# Patient Record
Sex: MALE | Race: WHITE | NOT HISPANIC OR LATINO | Employment: FULL TIME | URBAN - METROPOLITAN AREA
[De-identification: names, ages, dates, MRNs, and addresses within clinical notes are randomized per-mention and may not be internally consistent; named-entity substitution may affect disease eponyms.]

---

## 2023-05-10 ENCOUNTER — OFFICE VISIT (OUTPATIENT)
Dept: PRIMARY CARE | Facility: CLINIC | Age: 48
End: 2023-05-10
Payer: COMMERCIAL

## 2023-05-10 VITALS
HEART RATE: 83 BPM | WEIGHT: 163 LBS | SYSTOLIC BLOOD PRESSURE: 130 MMHG | BODY MASS INDEX: 24.14 KG/M2 | DIASTOLIC BLOOD PRESSURE: 86 MMHG | OXYGEN SATURATION: 96 % | HEIGHT: 69 IN | RESPIRATION RATE: 16 BRPM | TEMPERATURE: 97.3 F

## 2023-05-10 DIAGNOSIS — Z72.0 TOBACCO ABUSE: ICD-10-CM

## 2023-05-10 DIAGNOSIS — F43.22 ANXIOUS MOOD AS ADJUSTMENT REACTION: ICD-10-CM

## 2023-05-10 DIAGNOSIS — M65.312 TRIGGER FINGER OF LEFT THUMB: ICD-10-CM

## 2023-05-10 DIAGNOSIS — Z00.00 HEALTHCARE MAINTENANCE: Primary | ICD-10-CM

## 2023-05-10 PROCEDURE — 99386 PREV VISIT NEW AGE 40-64: CPT | Performed by: FAMILY MEDICINE

## 2023-05-10 RX ORDER — BUPROPION HYDROCHLORIDE 150 MG/1
150 TABLET ORAL EVERY MORNING
Qty: 30 TABLET | Refills: 1 | Status: SHIPPED | OUTPATIENT
Start: 2023-05-10 | End: 2023-05-22 | Stop reason: DRUGHIGH

## 2023-05-10 RX ORDER — VARENICLINE TARTRATE 1 MG/1
1 TABLET, FILM COATED ORAL 2 TIMES DAILY
Qty: 60 TABLET | Refills: 2 | Status: SHIPPED | OUTPATIENT
Start: 2023-05-10 | End: 2023-05-22 | Stop reason: SINTOL

## 2023-05-10 NOTE — PROGRESS NOTES
"Subjective   Patient ID: Kamran Diaz is a 47 y.o. male who presents for Establish Care, right thumb, and right foot problem.  Annual exam and checkup  HPI annual exam and checkup  Patient presents today as a new patient  His last PCP was never had one  Last seen this PCP  Choose to come here because pt was referral here    Being seen today for pt want to stop smoking he as been smoking for 30 years  Pt also want better his health.  Pt is issues right thumb very stiff painful x few month   Pt  Rt very painful at night can't move it much.  Pt  states having issues his right foot at when gets up his   Foot want give out.  X 6 month.    No other concern    Review of systems  ; Patient seen today for exam denies any problems with headaches or vision, denies any shortness of breath chest pain nausea or vomiting, no black stool no blood in the stool no heartburn type symptoms denies any problems with constipation or diarrhea, and no dysuria-type symptoms    The patient's allergies medications were reviewed with them today    The patient's social family and surgical history or also reviewed here today, along with her past medical history.     Objective     Alert and active in  no acute distress  HEENT TMs clear oropharynx negative nares clear no drainage noted neck supple  With no adenopathy   Heart regular rate and rhythm without murmur and no carotid bruits  Lungs- clear to auscultation bilaterally, no wheeze or rhonchi noted  Thyroid -negative masses or nodularity  Abdomen- soft times four quadrants , bowel sounds positive no masses or organomegaly, negative tenderness guarding or rebound  Neurological exam unremarkable- DTRs in upper and lower extremities within normal limits.   skin -no lesions noted    Left trigger thumb noted      /86 (BP Location: Right arm, Patient Position: Sitting, BP Cuff Size: Adult)   Pulse 83   Temp 36.3 °C (97.3 °F) (Temporal)   Resp 16   Ht 1.753 m (5' 9\")   Wt 73.9 kg (163 lb)  "  SpO2 96%   BMI 24.07 kg/m²     No Known Allergies    Assessment/Plan   Problem List Items Addressed This Visit    None  Visit Diagnoses       Healthcare maintenance    -  Primary    Relevant Orders    Lipid Panel    CBC and Auto Differential    Comprehensive Metabolic Panel    Tobacco abuse        Relevant Medications    varenicline (Chantix) 1 mg tablet    buPROPion XL (Wellbutrin XL) 150 mg 24 hr tablet    Anxious mood as adjustment reaction        Relevant Medications    buPROPion XL (Wellbutrin XL) 150 mg 24 hr tablet    Trigger finger of left thumb            We encouraged him to continue to quit smoking he does not want does not want to hurt himself or others he will go ahead and start the Wellbutrin may help with his mood a little bit over the loss of his girlfriend and their break-up at this time and also try the Chantix one half tab twice a day at breakfast and dinner and then twice a day he will see doing next 4 weeks please follow up back in the office if things worsen or change, and go to the ER if you feel LIKE  your symptoms are life-threatening talk about some of the issues and get his blood work done      If anything worsens or changes please call us at once, follow up in the office as planned,

## 2023-05-15 ENCOUNTER — LAB (OUTPATIENT)
Dept: LAB | Facility: LAB | Age: 48
End: 2023-05-15
Payer: COMMERCIAL

## 2023-05-15 DIAGNOSIS — Z00.00 HEALTHCARE MAINTENANCE: ICD-10-CM

## 2023-05-15 LAB
ALANINE AMINOTRANSFERASE (SGPT) (U/L) IN SER/PLAS: 15 U/L (ref 10–52)
ALBUMIN (G/DL) IN SER/PLAS: 4.3 G/DL (ref 3.4–5)
ALKALINE PHOSPHATASE (U/L) IN SER/PLAS: 91 U/L (ref 33–120)
ANION GAP IN SER/PLAS: 12 MMOL/L (ref 10–20)
ASPARTATE AMINOTRANSFERASE (SGOT) (U/L) IN SER/PLAS: 13 U/L (ref 9–39)
BASOPHILS (10*3/UL) IN BLOOD BY AUTOMATED COUNT: 0.04 X10E9/L (ref 0–0.1)
BASOPHILS/100 LEUKOCYTES IN BLOOD BY AUTOMATED COUNT: 0.4 % (ref 0–2)
BILIRUBIN TOTAL (MG/DL) IN SER/PLAS: 0.7 MG/DL (ref 0–1.2)
CALCIUM (MG/DL) IN SER/PLAS: 9.5 MG/DL (ref 8.6–10.3)
CARBON DIOXIDE, TOTAL (MMOL/L) IN SER/PLAS: 26 MMOL/L (ref 21–32)
CHLORIDE (MMOL/L) IN SER/PLAS: 104 MMOL/L (ref 98–107)
CHOLESTEROL (MG/DL) IN SER/PLAS: 183 MG/DL (ref 0–199)
CHOLESTEROL IN HDL (MG/DL) IN SER/PLAS: 47.2 MG/DL
CHOLESTEROL/HDL RATIO: 3.9
CREATININE (MG/DL) IN SER/PLAS: 0.79 MG/DL (ref 0.5–1.3)
EOSINOPHILS (10*3/UL) IN BLOOD BY AUTOMATED COUNT: 0.12 X10E9/L (ref 0–0.7)
EOSINOPHILS/100 LEUKOCYTES IN BLOOD BY AUTOMATED COUNT: 1.2 % (ref 0–6)
ERYTHROCYTE DISTRIBUTION WIDTH (RATIO) BY AUTOMATED COUNT: 13.8 % (ref 11.5–14.5)
ERYTHROCYTE MEAN CORPUSCULAR HEMOGLOBIN CONCENTRATION (G/DL) BY AUTOMATED: 34.4 G/DL (ref 32–36)
ERYTHROCYTE MEAN CORPUSCULAR VOLUME (FL) BY AUTOMATED COUNT: 90 FL (ref 80–100)
ERYTHROCYTES (10*6/UL) IN BLOOD BY AUTOMATED COUNT: 4.31 X10E12/L (ref 4.5–5.9)
GFR MALE: >90 ML/MIN/1.73M2
GLUCOSE (MG/DL) IN SER/PLAS: 92 MG/DL (ref 74–99)
HEMATOCRIT (%) IN BLOOD BY AUTOMATED COUNT: 38.7 % (ref 41–52)
HEMOGLOBIN (G/DL) IN BLOOD: 13.3 G/DL (ref 13.5–17.5)
IMMATURE GRANULOCYTES/100 LEUKOCYTES IN BLOOD BY AUTOMATED COUNT: 0.3 % (ref 0–0.9)
LDL: 119 MG/DL (ref 0–99)
LEUKOCYTES (10*3/UL) IN BLOOD BY AUTOMATED COUNT: 9.6 X10E9/L (ref 4.4–11.3)
LYMPHOCYTES (10*3/UL) IN BLOOD BY AUTOMATED COUNT: 2.75 X10E9/L (ref 1.2–4.8)
LYMPHOCYTES/100 LEUKOCYTES IN BLOOD BY AUTOMATED COUNT: 28.5 % (ref 13–44)
MONOCYTES (10*3/UL) IN BLOOD BY AUTOMATED COUNT: 0.85 X10E9/L (ref 0.1–1)
MONOCYTES/100 LEUKOCYTES IN BLOOD BY AUTOMATED COUNT: 8.8 % (ref 2–10)
NEUTROPHILS (10*3/UL) IN BLOOD BY AUTOMATED COUNT: 5.85 X10E9/L (ref 1.2–7.7)
NEUTROPHILS/100 LEUKOCYTES IN BLOOD BY AUTOMATED COUNT: 60.8 % (ref 40–80)
PLATELETS (10*3/UL) IN BLOOD AUTOMATED COUNT: 233 X10E9/L (ref 150–450)
POTASSIUM (MMOL/L) IN SER/PLAS: 3.8 MMOL/L (ref 3.5–5.3)
PROTEIN TOTAL: 7.3 G/DL (ref 6.4–8.2)
SODIUM (MMOL/L) IN SER/PLAS: 138 MMOL/L (ref 136–145)
TRIGLYCERIDE (MG/DL) IN SER/PLAS: 86 MG/DL (ref 0–149)
UREA NITROGEN (MG/DL) IN SER/PLAS: 13 MG/DL (ref 6–23)
VLDL: 17 MG/DL (ref 0–40)

## 2023-05-15 PROCEDURE — 36415 COLL VENOUS BLD VENIPUNCTURE: CPT

## 2023-05-15 PROCEDURE — 85025 COMPLETE CBC W/AUTO DIFF WBC: CPT

## 2023-05-15 PROCEDURE — 80061 LIPID PANEL: CPT

## 2023-05-15 PROCEDURE — 80053 COMPREHEN METABOLIC PANEL: CPT

## 2023-05-22 ENCOUNTER — OFFICE VISIT (OUTPATIENT)
Dept: PRIMARY CARE | Facility: CLINIC | Age: 48
End: 2023-05-22
Payer: COMMERCIAL

## 2023-05-22 VITALS
HEIGHT: 69 IN | TEMPERATURE: 97.8 F | OXYGEN SATURATION: 97 % | HEART RATE: 81 BPM | DIASTOLIC BLOOD PRESSURE: 80 MMHG | WEIGHT: 166 LBS | BODY MASS INDEX: 24.59 KG/M2 | SYSTOLIC BLOOD PRESSURE: 134 MMHG | RESPIRATION RATE: 16 BRPM

## 2023-05-22 DIAGNOSIS — F32.9 REACTIVE DEPRESSION: ICD-10-CM

## 2023-05-22 DIAGNOSIS — Z72.0 TOBACCO ABUSE: ICD-10-CM

## 2023-05-22 DIAGNOSIS — F43.22 ANXIOUS MOOD AS ADJUSTMENT REACTION: Primary | ICD-10-CM

## 2023-05-22 DIAGNOSIS — G47.09 OTHER INSOMNIA: ICD-10-CM

## 2023-05-22 PROCEDURE — 99213 OFFICE O/P EST LOW 20 MIN: CPT | Performed by: FAMILY MEDICINE

## 2023-05-22 RX ORDER — BUPROPION HYDROCHLORIDE 300 MG/1
300 TABLET ORAL EVERY MORNING
Qty: 30 TABLET | Refills: 2 | Status: SHIPPED | OUTPATIENT
Start: 2023-05-22

## 2023-05-22 RX ORDER — HYDROXYZINE HYDROCHLORIDE 25 MG/1
TABLET, FILM COATED ORAL
Qty: 60 TABLET | Refills: 2 | Status: SHIPPED | OUTPATIENT
Start: 2023-05-22

## 2023-05-22 NOTE — PROGRESS NOTES
"Subjective   Patient ID: Kamran Diaz is a 47 y.o. male who presents for Stress and Nicotine Dependence.    HPI    Pt is here for 2 week follow for smoking and stress  Pt states that the side of effect from Chantix has been keep him up at night.  He states he has been getting a lot of dreams.  Pt when he takes his medication in the morning it makes him very jittery   Pt states that he is very stress out has to much on his plate.  Pt is still issues with depression  Pt want to discuss seeing a counselor.  Denies wanting to harm self or others.  Does not want to harm ex.  Admits not being able to sleep.  He states he is overly emotional.        Review of systems  ; Patient seen today for exam denies any problems with headaches or vision, denies any shortness of breath chest pain nausea or vomiting, no black stool no blood in the stool no heartburn type symptoms denies any problems with constipation or diarrhea, and no dysuria-type symptoms    The patient's allergies medications were reviewed with them today    The patient's social family and surgical history or also reviewed here today, along with her past medical history.     Objective     Alert and active in  no acute distress  HEENT TMs clear oropharynx negative nares clear no drainage noted neck supple  With no adenopathy   Heart regular rate and rhythm without murmur and no carotid bruits  Lungs- clear to auscultation bilaterally, no wheeze or rhonchi noted  Thyroid -negative masses or nodularity  Abdomen- soft times four quadrants , bowel sounds positive no masses or organomegaly, negative tenderness guarding or rebound  Neurological exam unremarkable- DTRs in upper and lower extremities within normal limits.   skin -no lesions noted      /80 (BP Location: Right arm, Patient Position: Sitting)   Pulse 81   Temp 36.6 °C (97.8 °F) (Temporal)   Resp 16   Ht 1.753 m (5' 9\")   Wt 75.3 kg (166 lb)   SpO2 97%   BMI 24.51 kg/m²     No Known " Allergies    Assessment/Plan   Problem List Items Addressed This Visit    None  Visit Diagnoses       Tobacco abuse        Anxious mood as adjustment reaction        Other insomnia              Stop Chantix.    Increase Wellbutrin to 300 mg.    Atarax 25mg 1-2 tablets at bedtime prescribed today.    Referral to psychiatry placed.    If anything worsens or changes please call us at once, follow up in the office as planned.Let us know by anything    Scribe Attestation  By signing my name below, I, Do Patel MA, Scribe   attest that this documentation has been prepared under the direction and in the presence of Kirt Duron DO.

## 2023-05-24 ENCOUNTER — APPOINTMENT (OUTPATIENT)
Dept: PRIMARY CARE | Facility: CLINIC | Age: 48
End: 2023-05-24
Payer: COMMERCIAL

## 2025-02-13 ENCOUNTER — HOSPITAL ENCOUNTER (EMERGENCY)
Facility: HOSPITAL | Age: 50
Discharge: HOME | End: 2025-02-13
Attending: EMERGENCY MEDICINE
Payer: COMMERCIAL

## 2025-02-13 ENCOUNTER — APPOINTMENT (OUTPATIENT)
Dept: RADIOLOGY | Facility: HOSPITAL | Age: 50
End: 2025-02-13
Payer: COMMERCIAL

## 2025-02-13 VITALS
DIASTOLIC BLOOD PRESSURE: 73 MMHG | RESPIRATION RATE: 16 BRPM | WEIGHT: 170 LBS | TEMPERATURE: 97.7 F | SYSTOLIC BLOOD PRESSURE: 144 MMHG | HEIGHT: 69 IN | OXYGEN SATURATION: 99 % | BODY MASS INDEX: 25.18 KG/M2 | HEART RATE: 102 BPM

## 2025-02-13 DIAGNOSIS — K57.92 DIVERTICULITIS: Primary | ICD-10-CM

## 2025-02-13 LAB
ALBUMIN SERPL BCP-MCNC: 4.1 G/DL (ref 3.4–5)
ALP SERPL-CCNC: 83 U/L (ref 33–120)
ALT SERPL W P-5'-P-CCNC: 22 U/L (ref 10–52)
ANION GAP SERPL CALC-SCNC: 12 MMOL/L (ref 10–20)
AST SERPL W P-5'-P-CCNC: 16 U/L (ref 9–39)
BASOPHILS # BLD AUTO: 0.05 X10*3/UL (ref 0–0.1)
BASOPHILS NFR BLD AUTO: 0.3 %
BILIRUB DIRECT SERPL-MCNC: 0.1 MG/DL (ref 0–0.3)
BILIRUB SERPL-MCNC: 0.6 MG/DL (ref 0–1.2)
BUN SERPL-MCNC: 17 MG/DL (ref 6–23)
CALCIUM SERPL-MCNC: 9.3 MG/DL (ref 8.6–10.3)
CHLORIDE SERPL-SCNC: 105 MMOL/L (ref 98–107)
CO2 SERPL-SCNC: 26 MMOL/L (ref 21–32)
CREAT SERPL-MCNC: 0.75 MG/DL (ref 0.5–1.3)
EGFRCR SERPLBLD CKD-EPI 2021: >90 ML/MIN/1.73M*2
EOSINOPHIL # BLD AUTO: 0.07 X10*3/UL (ref 0–0.7)
EOSINOPHIL NFR BLD AUTO: 0.4 %
ERYTHROCYTE [DISTWIDTH] IN BLOOD BY AUTOMATED COUNT: 13.4 % (ref 11.5–14.5)
GLUCOSE SERPL-MCNC: 101 MG/DL (ref 74–99)
HCT VFR BLD AUTO: 44.5 % (ref 41–52)
HGB BLD-MCNC: 15.1 G/DL (ref 13.5–17.5)
IMM GRANULOCYTES # BLD AUTO: 0.07 X10*3/UL (ref 0–0.7)
IMM GRANULOCYTES NFR BLD AUTO: 0.4 % (ref 0–0.9)
LACTATE SERPL-SCNC: 0.7 MMOL/L (ref 0.4–2)
LIPASE SERPL-CCNC: 3 U/L (ref 9–82)
LYMPHOCYTES # BLD AUTO: 1.7 X10*3/UL (ref 1.2–4.8)
LYMPHOCYTES NFR BLD AUTO: 10.4 %
MCH RBC QN AUTO: 30.8 PG (ref 26–34)
MCHC RBC AUTO-ENTMCNC: 33.9 G/DL (ref 32–36)
MCV RBC AUTO: 91 FL (ref 80–100)
MONOCYTES # BLD AUTO: 1.38 X10*3/UL (ref 0.1–1)
MONOCYTES NFR BLD AUTO: 8.4 %
NEUTROPHILS # BLD AUTO: 13.11 X10*3/UL (ref 1.2–7.7)
NEUTROPHILS NFR BLD AUTO: 80.1 %
NRBC BLD-RTO: 0 /100 WBCS (ref 0–0)
PLATELET # BLD AUTO: 207 X10*3/UL (ref 150–450)
POTASSIUM SERPL-SCNC: 3.8 MMOL/L (ref 3.5–5.3)
PROT SERPL-MCNC: 7.9 G/DL (ref 6.4–8.2)
RBC # BLD AUTO: 4.9 X10*6/UL (ref 4.5–5.9)
SODIUM SERPL-SCNC: 139 MMOL/L (ref 136–145)
WBC # BLD AUTO: 16.4 X10*3/UL (ref 4.4–11.3)

## 2025-02-13 PROCEDURE — 36415 COLL VENOUS BLD VENIPUNCTURE: CPT | Performed by: NURSE PRACTITIONER

## 2025-02-13 PROCEDURE — 85025 COMPLETE CBC W/AUTO DIFF WBC: CPT | Performed by: NURSE PRACTITIONER

## 2025-02-13 PROCEDURE — 74177 CT ABD & PELVIS W/CONTRAST: CPT | Performed by: RADIOLOGY

## 2025-02-13 PROCEDURE — 74177 CT ABD & PELVIS W/CONTRAST: CPT

## 2025-02-13 PROCEDURE — 82374 ASSAY BLOOD CARBON DIOXIDE: CPT | Performed by: NURSE PRACTITIONER

## 2025-02-13 PROCEDURE — 96375 TX/PRO/DX INJ NEW DRUG ADDON: CPT

## 2025-02-13 PROCEDURE — 83690 ASSAY OF LIPASE: CPT | Performed by: NURSE PRACTITIONER

## 2025-02-13 PROCEDURE — 2500000004 HC RX 250 GENERAL PHARMACY W/ HCPCS (ALT 636 FOR OP/ED): Performed by: NURSE PRACTITIONER

## 2025-02-13 PROCEDURE — 84075 ASSAY ALKALINE PHOSPHATASE: CPT | Performed by: NURSE PRACTITIONER

## 2025-02-13 PROCEDURE — 99285 EMERGENCY DEPT VISIT HI MDM: CPT | Mod: 25 | Performed by: EMERGENCY MEDICINE

## 2025-02-13 PROCEDURE — 96374 THER/PROPH/DIAG INJ IV PUSH: CPT

## 2025-02-13 PROCEDURE — 83605 ASSAY OF LACTIC ACID: CPT | Performed by: NURSE PRACTITIONER

## 2025-02-13 PROCEDURE — 2550000001 HC RX 255 CONTRASTS: Performed by: NURSE PRACTITIONER

## 2025-02-13 RX ORDER — AMOXICILLIN AND CLAVULANATE POTASSIUM 875; 125 MG/1; MG/1
875 TABLET, FILM COATED ORAL EVERY 8 HOURS
Qty: 21 TABLET | Refills: 0 | Status: SHIPPED | OUTPATIENT
Start: 2025-02-13 | End: 2025-02-20

## 2025-02-13 RX ORDER — OXYCODONE AND ACETAMINOPHEN 5; 325 MG/1; MG/1
1 TABLET ORAL EVERY 6 HOURS PRN
Qty: 6 TABLET | Refills: 0 | Status: SHIPPED | OUTPATIENT
Start: 2025-02-13

## 2025-02-13 RX ORDER — POLYETHYLENE GLYCOL 3350 17 G/17G
17 POWDER, FOR SOLUTION ORAL DAILY
Qty: 289 G | Refills: 0 | Status: SHIPPED | OUTPATIENT
Start: 2025-02-13 | End: 2025-02-18

## 2025-02-13 RX ORDER — KETOROLAC TROMETHAMINE 30 MG/ML
15 INJECTION, SOLUTION INTRAMUSCULAR; INTRAVENOUS ONCE
Status: COMPLETED | OUTPATIENT
Start: 2025-02-13 | End: 2025-02-13

## 2025-02-13 RX ORDER — NAPROXEN 500 MG/1
500 TABLET ORAL 2 TIMES DAILY PRN
Qty: 30 TABLET | Refills: 0 | Status: SHIPPED | OUTPATIENT
Start: 2025-02-13

## 2025-02-13 RX ORDER — ONDANSETRON HYDROCHLORIDE 2 MG/ML
4 INJECTION, SOLUTION INTRAVENOUS ONCE
Status: COMPLETED | OUTPATIENT
Start: 2025-02-13 | End: 2025-02-13

## 2025-02-13 RX ADMIN — ONDANSETRON 4 MG: 2 INJECTION INTRAMUSCULAR; INTRAVENOUS at 07:44

## 2025-02-13 RX ADMIN — KETOROLAC TROMETHAMINE 15 MG: 30 INJECTION, SOLUTION INTRAMUSCULAR at 07:44

## 2025-02-13 RX ADMIN — IOHEXOL 75 ML: 350 INJECTION, SOLUTION INTRAVENOUS at 07:56

## 2025-02-13 ASSESSMENT — PAIN SCALES - GENERAL
PAINLEVEL_OUTOF10: 5 - MODERATE PAIN
PAINLEVEL_OUTOF10: 10 - WORST POSSIBLE PAIN
PAINLEVEL_OUTOF10: 10 - WORST POSSIBLE PAIN

## 2025-02-13 ASSESSMENT — PAIN - FUNCTIONAL ASSESSMENT
PAIN_FUNCTIONAL_ASSESSMENT: 0-10
PAIN_FUNCTIONAL_ASSESSMENT: 0-10

## 2025-02-13 ASSESSMENT — PAIN DESCRIPTION - LOCATION
LOCATION: ABDOMEN

## 2025-02-13 ASSESSMENT — COLUMBIA-SUICIDE SEVERITY RATING SCALE - C-SSRS
1. IN THE PAST MONTH, HAVE YOU WISHED YOU WERE DEAD OR WISHED YOU COULD GO TO SLEEP AND NOT WAKE UP?: NO
2. HAVE YOU ACTUALLY HAD ANY THOUGHTS OF KILLING YOURSELF?: NO
6. HAVE YOU EVER DONE ANYTHING, STARTED TO DO ANYTHING, OR PREPARED TO DO ANYTHING TO END YOUR LIFE?: NO

## 2025-02-13 ASSESSMENT — LIFESTYLE VARIABLES
HAVE PEOPLE ANNOYED YOU BY CRITICIZING YOUR DRINKING: NO
TOTAL SCORE: 0
EVER FELT BAD OR GUILTY ABOUT YOUR DRINKING: NO
EVER HAD A DRINK FIRST THING IN THE MORNING TO STEADY YOUR NERVES TO GET RID OF A HANGOVER: NO
HAVE YOU EVER FELT YOU SHOULD CUT DOWN ON YOUR DRINKING: NO

## 2025-02-13 ASSESSMENT — PAIN DESCRIPTION - FREQUENCY: FREQUENCY: CONSTANT/CONTINUOUS

## 2025-02-13 ASSESSMENT — PAIN DESCRIPTION - PAIN TYPE
TYPE: ACUTE PAIN
TYPE: ACUTE PAIN

## 2025-02-13 ASSESSMENT — PAIN DESCRIPTION - DESCRIPTORS
DESCRIPTORS: ACHING
DESCRIPTORS: SHARP

## 2025-02-13 ASSESSMENT — PAIN DESCRIPTION - ORIENTATION
ORIENTATION: RIGHT;LEFT;MID;LOWER
ORIENTATION: LEFT;RIGHT;LOWER

## 2025-02-13 NOTE — ED PROVIDER NOTES
HPI   Chief Complaint   Patient presents with    Abdominal Pain     Abd pain since Monday. Constipation as well         49-year-old male presents emergency department, states pain left side of his abdomen, started Monday, got worse Tuesday she started taking some antibiotics that he had leftover from previous episode of diverticulitis, was feeling better yesterday and plan to go to work today but states woke up in the middle the night with much worse severe pain.  States has been constipated.  No vomiting or diarrhea.  No fever.      History provided by:  Patient   used: No            Patient History   No past medical history on file.  No past surgical history on file.  No family history on file.  Social History     Tobacco Use    Smoking status: Every Day     Current packs/day: 1.00     Types: Cigarettes    Smokeless tobacco: Current     Types: Chew   Vaping Use    Vaping status: Never Used   Substance Use Topics    Alcohol use: Not Currently    Drug use: Yes     Types: Marijuana       Physical Exam   ED Triage Vitals [02/13/25 0621]   Temperature Heart Rate Respirations BP   36.6 °C (97.9 °F) (!) 105 17 (!) 182/106      Pulse Ox Temp Source Heart Rate Source Patient Position   99 % Temporal Monitor --      BP Location FiO2 (%)     -- --       Physical Exam  Constitutional: Vitals noted, no distress. Afebrile.   Cardiovascular: Regular, rate, rhythm, no murmur.   Pulmonary: Lungs clear bilaterally with good aeration. No adventitious breath sounds.   Gastrointestinal: Soft, nonsurgical.  Tender left abdomen. No peritoneal signs. Normoactive bowel sounds.   Musculoskeletal: No peripheral edema. Negative Homans bilaterally, no cords.   Skin: No rash.   Neuro: No focal neurologic deficits, NIH score of 0.      ED Course & MDM   Diagnoses as of 02/13/25 0912   Diverticulitis          Labs Reviewed   CBC WITH AUTO DIFFERENTIAL - Abnormal       Result Value    WBC 16.4 (*)     nRBC 0.0      RBC 4.90       Hemoglobin 15.1      Hematocrit 44.5      MCV 91      MCH 30.8      MCHC 33.9      RDW 13.4      Platelets 207      Neutrophils % 80.1      Immature Granulocytes %, Automated 0.4      Lymphocytes % 10.4      Monocytes % 8.4      Eosinophils % 0.4      Basophils % 0.3      Neutrophils Absolute 13.11 (*)     Immature Granulocytes Absolute, Automated 0.07      Lymphocytes Absolute 1.70      Monocytes Absolute 1.38 (*)     Eosinophils Absolute 0.07      Basophils Absolute 0.05     BASIC METABOLIC PANEL - Abnormal    Glucose 101 (*)     Sodium 139      Potassium 3.8      Chloride 105      Bicarbonate 26      Anion Gap 12      Urea Nitrogen 17      Creatinine 0.75      eGFR >90      Calcium 9.3     LIPASE - Abnormal    Lipase 3 (*)     Narrative:     Venipuncture immediately after or during the administration of Metamizole may lead to falsely low results. Testing should be performed immediately prior to Metamizole dosing.   HEPATIC FUNCTION PANEL - Normal    Albumin 4.1      Bilirubin, Total 0.6      Bilirubin, Direct 0.1      Alkaline Phosphatase 83      ALT 22      AST 16      Total Protein 7.9     LACTATE - Normal    Lactate 0.7      Narrative:     Venipuncture immediately after or during the administration of Metamizole may lead to falsely low results. Testing should be performed immediately prior to Metamizole dosing.        CT abdomen pelvis w IV contrast   Final Result   Long segment severe sigmoid colitis without perforation or fluid   collection.        MACRO:   None.        Signed by: Casey Mccoy 2/13/2025 8:17 AM   Dictation workstation:   [id number]               No data recorded     Lina Coma Scale Score: 15 (02/13/25 0623 : Bernie Cristina RN)                   Medical Decision Making    Patient presents with severe left-sided abdominal pain.  History of diverticulitis, although states location of pain is different today than previous episodes.    Offer the patient medication for pain, only wanted  something mild to given 15 mg of IV Toradol, 4 mg of IV Zofran.    Workup, CBC does show an elevated white count to 16.4, although normal lactate level.  Metabolic panel is unremarkable, lipase normal.    CT imaging of the abdomen/pelvis does show long segment severe sigmoid colitis without perforation or fluid collection.    Carefully discussed results with the patient, recommended hospitalization for this patient, although after conversation the patient is refusing hospitalization due to financial reasons.    Patient will be started on Augmentin 3 times daily for diverticulitis, discussed pain medication and MiraLAX as a stool softener.    Carefully discussed strict return precautions, any worsening pain, fever, feeling worse at all the patient needs to return.  Discussed high risk for abscess formation or perforation.  Patient demonstrated understanding of these concerns, but continues to decline hospitalization.    Discussed otherwise he needs to closely follow-up with his primary care.  Again encouraged to return    Shared AMOL Attestation:    I personally saw the patient and made/approved the management plan and take responsibility for the patient management.     History: 49-year-old male presents with abdominal pain.    Exam: Tachycardic but regular rhythm cardiac exam with clear breath sounds bilaterally.  Abdomen is soft with tenderness to palpation to the left abdomen.  No rebound or guarding.  Neurological exam is grossly intact.    MDM: Colitis, bowel obstruction, diverticulitis    Labs Reviewed   CBC WITH AUTO DIFFERENTIAL - Abnormal       Result Value    WBC 16.4 (*)     nRBC 0.0      RBC 4.90      Hemoglobin 15.1      Hematocrit 44.5      MCV 91      MCH 30.8      MCHC 33.9      RDW 13.4      Platelets 207      Neutrophils % 80.1      Immature Granulocytes %, Automated 0.4      Lymphocytes % 10.4      Monocytes % 8.4      Eosinophils % 0.4      Basophils % 0.3      Neutrophils Absolute 13.11 (*)      Immature Granulocytes Absolute, Automated 0.07      Lymphocytes Absolute 1.70      Monocytes Absolute 1.38 (*)     Eosinophils Absolute 0.07      Basophils Absolute 0.05     BASIC METABOLIC PANEL - Abnormal    Glucose 101 (*)     Sodium 139      Potassium 3.8      Chloride 105      Bicarbonate 26      Anion Gap 12      Urea Nitrogen 17      Creatinine 0.75      eGFR >90      Calcium 9.3     LIPASE - Abnormal    Lipase 3 (*)     Narrative:     Venipuncture immediately after or during the administration of Metamizole may lead to falsely low results. Testing should be performed immediately prior to Metamizole dosing.   HEPATIC FUNCTION PANEL - Normal    Albumin 4.1      Bilirubin, Total 0.6      Bilirubin, Direct 0.1      Alkaline Phosphatase 83      ALT 22      AST 16      Total Protein 7.9     LACTATE - Normal    Lactate 0.7      Narrative:     Venipuncture immediately after or during the administration of Metamizole may lead to falsely low results. Testing should be performed immediately prior to Metamizole dosing.       CT abdomen pelvis w IV contrast   Final Result   Long segment severe sigmoid colitis without perforation or fluid   collection.        MACRO:   None.        Signed by: Casey Mccoy 2/13/2025 8:17 AM   Dictation workstation:   [id number]              Jatin Kang MD      Procedure  Procedures     Delmis Suero, JAZMIN-DECLAN  02/13/25 0918

## 2025-02-13 NOTE — Clinical Note
Kamran Diaz was seen and treated in our emergency department on 2/13/2025.  He may return to work on 02/17/2025.       If you have any questions or concerns, please don't hesitate to call.      Delmis Suero, APRN-CNP

## 2025-04-13 ENCOUNTER — HOSPITAL ENCOUNTER (EMERGENCY)
Facility: HOSPITAL | Age: 50
Discharge: HOME | End: 2025-04-13
Attending: STUDENT IN AN ORGANIZED HEALTH CARE EDUCATION/TRAINING PROGRAM
Payer: COMMERCIAL

## 2025-04-13 ENCOUNTER — APPOINTMENT (OUTPATIENT)
Dept: RADIOLOGY | Facility: HOSPITAL | Age: 50
End: 2025-04-13
Payer: COMMERCIAL

## 2025-04-13 VITALS
DIASTOLIC BLOOD PRESSURE: 78 MMHG | OXYGEN SATURATION: 98 % | HEIGHT: 69 IN | RESPIRATION RATE: 18 BRPM | WEIGHT: 165 LBS | BODY MASS INDEX: 24.44 KG/M2 | HEART RATE: 88 BPM | TEMPERATURE: 98.8 F | SYSTOLIC BLOOD PRESSURE: 152 MMHG

## 2025-04-13 DIAGNOSIS — S92.534A CLOSED NONDISPLACED FRACTURE OF DISTAL PHALANX OF LESSER TOE OF RIGHT FOOT, INITIAL ENCOUNTER: Primary | ICD-10-CM

## 2025-04-13 DIAGNOSIS — L03.031 CELLULITIS OF TOE OF RIGHT FOOT: ICD-10-CM

## 2025-04-13 PROCEDURE — 99283 EMERGENCY DEPT VISIT LOW MDM: CPT | Performed by: STUDENT IN AN ORGANIZED HEALTH CARE EDUCATION/TRAINING PROGRAM

## 2025-04-13 PROCEDURE — 73630 X-RAY EXAM OF FOOT: CPT | Mod: RT

## 2025-04-13 PROCEDURE — 73630 X-RAY EXAM OF FOOT: CPT | Mod: RIGHT SIDE | Performed by: RADIOLOGY

## 2025-04-13 RX ORDER — OXYCODONE AND ACETAMINOPHEN 5; 325 MG/1; MG/1
1 TABLET ORAL EVERY 6 HOURS PRN
Qty: 8 TABLET | Refills: 0 | Status: SHIPPED | OUTPATIENT
Start: 2025-04-13 | End: 2025-04-15

## 2025-04-13 ASSESSMENT — PAIN DESCRIPTION - PAIN TYPE: TYPE: ACUTE PAIN

## 2025-04-13 ASSESSMENT — LIFESTYLE VARIABLES
HAVE PEOPLE ANNOYED YOU BY CRITICIZING YOUR DRINKING: NO
EVER HAD A DRINK FIRST THING IN THE MORNING TO STEADY YOUR NERVES TO GET RID OF A HANGOVER: NO
EVER FELT BAD OR GUILTY ABOUT YOUR DRINKING: NO
TOTAL SCORE: 0
HAVE YOU EVER FELT YOU SHOULD CUT DOWN ON YOUR DRINKING: NO

## 2025-04-13 ASSESSMENT — PAIN DESCRIPTION - FREQUENCY: FREQUENCY: CONSTANT/CONTINUOUS

## 2025-04-13 ASSESSMENT — COLUMBIA-SUICIDE SEVERITY RATING SCALE - C-SSRS
1. IN THE PAST MONTH, HAVE YOU WISHED YOU WERE DEAD OR WISHED YOU COULD GO TO SLEEP AND NOT WAKE UP?: NO
6. HAVE YOU EVER DONE ANYTHING, STARTED TO DO ANYTHING, OR PREPARED TO DO ANYTHING TO END YOUR LIFE?: NO
2. HAVE YOU ACTUALLY HAD ANY THOUGHTS OF KILLING YOURSELF?: NO

## 2025-04-13 ASSESSMENT — PAIN DESCRIPTION - DESCRIPTORS: DESCRIPTORS: THROBBING;PRESSURE

## 2025-04-13 ASSESSMENT — PAIN - FUNCTIONAL ASSESSMENT: PAIN_FUNCTIONAL_ASSESSMENT: 0-10

## 2025-04-13 ASSESSMENT — PAIN DESCRIPTION - ORIENTATION: ORIENTATION: RIGHT

## 2025-04-13 ASSESSMENT — PAIN DESCRIPTION - PROGRESSION: CLINICAL_PROGRESSION: NOT CHANGED

## 2025-04-13 ASSESSMENT — PAIN DESCRIPTION - LOCATION: LOCATION: FOOT

## 2025-04-13 ASSESSMENT — PAIN SCALES - GENERAL: PAINLEVEL_OUTOF10: 8

## 2025-04-13 NOTE — ED PROVIDER NOTES
HPI   Chief Complaint   Patient presents with    Toe Pain     Pt presents to the ED with infection in his right pinky toe. Was at the urgent care yesterday and they told him that if the pain got worse to go to the ER. Complains of difficulty standing and walking        Patient is a 49-year-old male presenting to the emergency department with complaints of right foot pain.  Patient states that he went to urgent care yesterday and was diagnosed with cellulitis and prescribed doxycycline.  Patient states that he took a dose of his doxycycline when he still experiencing pain which is why he presented to the emergency department today.  He denies any fevers, chills, traumatic injuries.  Patient states he has been using Tylenol and Motrin without any notable relief.  Patient states the redness and swelling to his foot has not gotten worse.      History provided by:  Patient          Patient History   Past Medical History:   Diagnosis Date    Diverticulitis      Past Surgical History:   Procedure Laterality Date    MIDDLE EAR SURGERY       No family history on file.  Social History     Tobacco Use    Smoking status: Every Day     Current packs/day: 1.00     Types: Cigarettes    Smokeless tobacco: Current     Types: Chew   Vaping Use    Vaping status: Never Used   Substance Use Topics    Alcohol use: Not Currently    Drug use: Yes     Types: Marijuana       Physical Exam   ED Triage Vitals [04/13/25 0719]   Temperature Heart Rate Respirations BP   37.1 °C (98.8 °F) 92 18 (!) 160/97      Pulse Ox Temp Source Heart Rate Source Patient Position   98 % Temporal -- Sitting      BP Location FiO2 (%)     Right arm --       Physical Exam  Vitals and nursing note reviewed.   Constitutional:       General: He is not in acute distress.     Appearance: Normal appearance. He is not ill-appearing, toxic-appearing or diaphoretic.   HENT:      Head: Normocephalic and atraumatic.      Nose: Nose normal.      Mouth/Throat:      Mouth: Mucous  membranes are moist.      Pharynx: No oropharyngeal exudate or posterior oropharyngeal erythema.   Eyes:      General: No scleral icterus.     Extraocular Movements: Extraocular movements intact.      Pupils: Pupils are equal, round, and reactive to light.   Cardiovascular:      Rate and Rhythm: Normal rate and regular rhythm.      Pulses: Normal pulses.      Heart sounds: Normal heart sounds. No murmur heard.     No friction rub. No gallop.   Pulmonary:      Effort: Pulmonary effort is normal. No respiratory distress.      Breath sounds: Normal breath sounds. No stridor. No wheezing, rhonchi or rales.   Chest:      Chest wall: No tenderness.   Abdominal:      General: Abdomen is flat. There is no distension.      Palpations: Abdomen is soft. There is no mass.      Tenderness: There is no abdominal tenderness. There is no guarding.      Hernia: No hernia is present.   Musculoskeletal:         General: Swelling and tenderness present. No deformity or signs of injury. Normal range of motion.      Cervical back: Normal range of motion and neck supple. No rigidity.   Skin:     General: Skin is warm and dry.      Capillary Refill: Capillary refill takes less than 2 seconds.      Coloration: Skin is not jaundiced or pale.      Findings: Erythema present. No bruising, lesion or rash.   Neurological:      General: No focal deficit present.      Mental Status: He is alert and oriented to person, place, and time. Mental status is at baseline.   Psychiatric:         Mood and Affect: Mood normal.         Behavior: Behavior normal.           ED Course & MDM   Diagnoses as of 04/13/25 1033   Closed nondisplaced fracture of distal phalanx of lesser toe of right foot, initial encounter   Cellulitis of toe of right foot                 No data recorded                                 Medical Decision Making  Patient is a 49-year-old male presents emergency department complaints of right foot pain.  Patient does have an area of  swelling and erythema to the right fifth digit overlying the distal fourth and fifth metatarsals.  Patient does have some thickened skin with an opening at the interdigital cleft between the fourth and fifth digit.  Redness demarcated by previous provider actually appears slightly improved from where the line was marked.  Patient is neurovascularly intact in the right lower extremity.  Patient does have tenderness overlying the area of redness and swelling.  X-ray was ordered which did show an avulsion fracture of the distal aspect of the fifth proximal phalanx along the lateral cortex.  Patient states that he has broken this toe before but states that he has had no recent traumatic injuries.  He was advised on sahil taping and to continue taking his doxycycline.  Prescription for pain medication was provided and he was given referral to podiatry.  Return precautions were given.  All questions were answered.  Patient was appreciative care and agreeable to discharge.    Amount and/or Complexity of Data Reviewed  Radiology: ordered. Decision-making details documented in ED Course.    Labs Reviewed - No data to display  XR foot right 3+ views   Final Result   Avulsion fracture at the distal aspect of the 5th proximal phalanx   along the lateral cortex.        Soft tissue swelling of the 5th toe.             MACRO:   None        Signed by: Edwar Elliott 4/13/2025 8:23 AM   Dictation workstation:   [id number]            Procedure  Procedures     Patel Villa DO  04/13/25 1033